# Patient Record
Sex: MALE | Race: WHITE | NOT HISPANIC OR LATINO | URBAN - METROPOLITAN AREA
[De-identification: names, ages, dates, MRNs, and addresses within clinical notes are randomized per-mention and may not be internally consistent; named-entity substitution may affect disease eponyms.]

---

## 2017-01-01 ENCOUNTER — HOSPITAL ENCOUNTER (OUTPATIENT)
Dept: NUCLEAR MEDICINE | Facility: HOSPITAL | Age: 77
Discharge: HOME | End: 2017-04-04
Attending: INTERNAL MEDICINE

## 2017-01-01 ENCOUNTER — AMBULATORY - HEALTHEAST (OUTPATIENT)
Dept: CARDIOLOGY | Facility: CLINIC | Age: 77
End: 2017-01-01

## 2017-01-01 ENCOUNTER — AMBULATORY - HEALTHEAST (OUTPATIENT)
Dept: CARDIAC REHAB | Facility: HOSPITAL | Age: 77
End: 2017-01-01

## 2017-01-01 ENCOUNTER — COMMUNICATION - HEALTHEAST (OUTPATIENT)
Dept: CARDIOLOGY | Facility: CLINIC | Age: 77
End: 2017-01-01

## 2017-01-01 ENCOUNTER — OFFICE VISIT - HEALTHEAST (OUTPATIENT)
Dept: CARDIOLOGY | Facility: CLINIC | Age: 77
End: 2017-01-01

## 2017-01-01 ENCOUNTER — SURGERY - HEALTHEAST (OUTPATIENT)
Dept: CARDIOLOGY | Facility: CLINIC | Age: 77
End: 2017-01-01

## 2017-01-01 ENCOUNTER — COMMUNICATION - HEALTHEAST (OUTPATIENT)
Dept: ADMINISTRATIVE | Facility: CLINIC | Age: 77
End: 2017-01-01

## 2017-01-01 ENCOUNTER — HOSPITAL ENCOUNTER (OUTPATIENT)
Dept: CARDIOLOGY | Facility: HOSPITAL | Age: 77
Discharge: HOME | End: 2017-04-04
Attending: INTERNAL MEDICINE

## 2017-01-01 DIAGNOSIS — I21.11 ST ELEVATION (STEMI) MYOCARDIAL INFARCTION INVOLVING RIGHT CORONARY ARTERY (H): ICD-10-CM

## 2017-01-01 DIAGNOSIS — I25.83 CORONARY ARTERY DISEASE DUE TO LIPID RICH PLAQUE: ICD-10-CM

## 2017-01-01 DIAGNOSIS — Z95.5 STENTED CORONARY ARTERY: ICD-10-CM

## 2017-01-01 DIAGNOSIS — I25.5 ISCHEMIC CARDIOMYOPATHY: ICD-10-CM

## 2017-01-01 DIAGNOSIS — I25.2 HISTORY OF ST ELEVATION MYOCARDIAL INFARCTION (STEMI): ICD-10-CM

## 2017-01-01 DIAGNOSIS — E78.5 HYPERLIPIDEMIA LDL GOAL <70: ICD-10-CM

## 2017-01-01 DIAGNOSIS — I10 UNCONTROLLED HYPERTENSION: ICD-10-CM

## 2017-01-01 DIAGNOSIS — I10 ESSENTIAL HYPERTENSION WITH GOAL BLOOD PRESSURE LESS THAN 130/80: ICD-10-CM

## 2017-01-01 DIAGNOSIS — R00.1 BRADYCARDIA: ICD-10-CM

## 2017-01-01 DIAGNOSIS — I25.10 CORONARY ARTERY DISEASE DUE TO LIPID RICH PLAQUE: ICD-10-CM

## 2017-01-01 LAB
CHOLEST SERPL-MCNC: 138 MG/DL
CV STRESS CURRENT BP HE: NORMAL
CV STRESS CURRENT HR HE: 102
CV STRESS CURRENT HR HE: 103
CV STRESS CURRENT HR HE: 110
CV STRESS CURRENT HR HE: 115
CV STRESS CURRENT HR HE: 116
CV STRESS CURRENT HR HE: 117
CV STRESS CURRENT HR HE: 117
CV STRESS CURRENT HR HE: 122
CV STRESS CURRENT HR HE: 123
CV STRESS CURRENT HR HE: 123
CV STRESS CURRENT HR HE: 124
CV STRESS CURRENT HR HE: 124
CV STRESS CURRENT HR HE: 129
CV STRESS CURRENT HR HE: 129
CV STRESS CURRENT HR HE: 62
CV STRESS CURRENT HR HE: 65
CV STRESS CURRENT HR HE: 72
CV STRESS CURRENT HR HE: 75
CV STRESS CURRENT HR HE: 79
CV STRESS CURRENT HR HE: 80
CV STRESS CURRENT HR HE: 80
CV STRESS CURRENT HR HE: 82
CV STRESS CURRENT HR HE: 86
CV STRESS CURRENT HR HE: 92
CV STRESS CURRENT HR HE: 93
CV STRESS CURRENT HR HE: 97
CV STRESS DEVIATION TIME HE: NORMAL
CV STRESS ECHO PERCENT HR HE: NORMAL
CV STRESS EXERCISE STAGE HE: NORMAL
CV STRESS EXERCISE STAGE REACHED HE: NORMAL
CV STRESS FINAL RESTING BP HE: NORMAL
CV STRESS FINAL RESTING HR HE: 72
CV STRESS MAX HR HE: 129
CV STRESS MAX TREADMILL GRADE HE: 14
CV STRESS MAX TREADMILL SPEED HE: 3.4
CV STRESS PEAK DIA BP HE: NORMAL
CV STRESS PEAK SYS BP HE: NORMAL
CV STRESS PHASE HE: NORMAL
CV STRESS PROTOCOL HE: NORMAL
CV STRESS RESTING PT POSITION HE: NORMAL
CV STRESS RESTING PT POSITION HE: NORMAL
CV STRESS ST DEVIATION AMOUNT HE: NORMAL
CV STRESS ST DEVIATION ELEVATION HE: NORMAL
CV STRESS ST EVELATION AMOUNT HE: NORMAL
CV STRESS TEST TYPE HE: NORMAL
CV STRESS TOTAL STAGE TIME MIN 1 HE: NORMAL
FASTING STATUS PATIENT QL REPORTED: ABNORMAL
HDLC SERPL-MCNC: 40 MG/DL
LDLC SERPL CALC-MCNC: 67 MG/DL
NUC STRESS EJECTION FRACTION: 68 %
STRESS ECHO BASELINE BP: NORMAL
STRESS ECHO BASELINE HR: 60
STRESS ECHO CALCULATED PERCENT HR: 90 %
STRESS ECHO LAST STRESS BP: NORMAL
STRESS ECHO LAST STRESS HR: 129
STRESS ECHO POST ESTIMATED WORKLOAD: 7.3
STRESS ECHO POST EXERCISE DUR MIN: 6
STRESS ECHO POST EXERCISE DUR SEC: 2
STRESS ECHO TARGET HR: 122
TRIGL SERPL-MCNC: 153 MG/DL

## 2017-01-01 RX ORDER — SIMVASTATIN 40 MG
40 TABLET ORAL AT BEDTIME
Qty: 90 TABLET | Refills: 3 | Status: SHIPPED | OUTPATIENT
Start: 2017-01-01

## 2017-01-01 RX ORDER — TICAGRELOR 90 MG/1
TABLET ORAL
Qty: 180 TABLET | Refills: 1 | Status: SHIPPED | OUTPATIENT
Start: 2017-01-01

## 2017-01-01 ASSESSMENT — MIFFLIN-ST. JEOR
SCORE: 1503.24
SCORE: 1503.24
SCORE: 1478.29
SCORE: 1503.24
SCORE: 1507.78
SCORE: 1516.85
SCORE: 1503.24
SCORE: 1527.74
SCORE: 1520.03
SCORE: 1516.85
SCORE: 1516.85

## 2021-05-30 VITALS — WEIGHT: 173 LBS | BODY MASS INDEX: 24.22 KG/M2 | HEIGHT: 71 IN

## 2021-05-30 VITALS — BODY MASS INDEX: 23.8 KG/M2 | HEIGHT: 71 IN | WEIGHT: 170 LBS

## 2021-05-30 VITALS — BODY MASS INDEX: 24.22 KG/M2 | WEIGHT: 173 LBS | HEIGHT: 71 IN

## 2021-05-30 VITALS — WEIGHT: 171 LBS | BODY MASS INDEX: 23.85 KG/M2

## 2021-05-30 VITALS — WEIGHT: 170 LBS | BODY MASS INDEX: 23.71 KG/M2

## 2021-05-30 VITALS — BODY MASS INDEX: 23.85 KG/M2 | WEIGHT: 171 LBS

## 2021-05-30 VITALS — HEIGHT: 71 IN | WEIGHT: 171 LBS | BODY MASS INDEX: 23.94 KG/M2

## 2021-05-30 VITALS — BODY MASS INDEX: 23.99 KG/M2 | WEIGHT: 172 LBS

## 2021-05-30 VITALS — BODY MASS INDEX: 24.22 KG/M2 | HEIGHT: 71 IN | WEIGHT: 173 LBS

## 2021-05-30 VITALS — BODY MASS INDEX: 24.13 KG/M2 | WEIGHT: 173 LBS

## 2021-05-30 VITALS — HEIGHT: 71 IN | WEIGHT: 170 LBS | BODY MASS INDEX: 23.8 KG/M2

## 2021-05-31 VITALS — BODY MASS INDEX: 24.05 KG/M2 | HEIGHT: 70 IN | WEIGHT: 168 LBS

## 2021-06-09 NOTE — PROGRESS NOTES
ITP ASSESSMENT   Assessment Day: 30 Day  Session Number: 9  Precautions: standard  Diagnosis: MI;Stent;CHF  Risk Stratification: Medium  Referring Provider: Dr. Herman    EXERCISE  Exercise Assessment: Reassessment  Pt is exercising in outpatient cardiac rehab 2x/week for 40 minutes. He is progressing well and has begun to do more around his house and yard.                            Exercise Plan  Goals Next 30 days  ADL'S: wash his car  Leisure: put deck furniture out  Work: mod house cleaning    Education Goals: All goals in this section met  Education Goals Met: Patient can state cardiac s/s and appropriate emergency response.;Has system for taking medication.                        Goals Met  Initial ADL's goals met: not met. he reports that he just got the OK from doctor  Initial Leisure goals met: met. he is walking at least 20 min/day  Intial Work goals met: not met,  Initial Progression: exercising at 2.6-3.7 mets    Exercise Prescription  Exercise Mode: Treadmill;Bike;Nustep;Arm Erg.;Stairs  Frequency: 2x/week  Duration: 40 min  Intensity / THR: 20-30 beats above resting heart rate  RPE 11-14  Progression / Met level: 2.6-3.7  Resistive Training?: No    Current Exercise (mins/week): 140    Interventions  Home Exercise:  Mode: walk  Frequency: daily  Duration: 30-40 minutes    Education Material : Educational videos;Provide written material;Individual education and counseling;Offer educational classes    Education Completed  Exercise Education Completed: Signs and Symptoms;RPE;Emergency Plan;Home Exercise;Warm up/cool down;FITT Principles;BP/HR Reponse to exercise;Benefits of Exercise;End point of exercise            Exercise Follow-up/Discharge  Follow up/Discharge: Pt is walking daily         NUTRITION  Nutrition Assessment: Reassessment    Nutrition Risk Factors:  Nutrition Risk Factors: Dyslipidemia  Cholesterol: 138  LDL: 67  HDL: 40  Triglycerides: 153    Nutrition Plan  Interventions  Nutrition  "Interventions: Diet consult;Provide with written material  Rate Your Plate Score: 58    Education Completed  Nutrition Education Completed: Low Saturated fat diet;Low sodium diet    Goals  Nutrition Goals (Next 30 days): Patient will follow a low sodium diet;Patient will follow a low saturated fat diet    Goals Met  Nutrition Goals Met: Patient follows a low sodium diet;Patient states following a low saturated fat diet    Height, Weight, and  BMI  Weight: 172 lb (78 kg)  Height: 5' 11\" (1.803 m)  BMI: 24    Nutrition Follow-up  Follow-up/Discharge: Reviewed recent lipid profile     Other Risk Factors  Other Risk Factor Assessment: Reassessment    HTN Risk Factor: Hypertension    Pre Exercise BP: 124/62  Post Exercise BP: 140/70    Hypertension Plan  Goals  HTN Goals: Patient demonstrates understanding of HTN, no goals identified for the next 30 days    Goals Met  HTN Goals Met: Follow low sodium diet;Take medication as prescribed;Exercises regularly    HTN Interventions  HTN Interventions: Diet consult;Therapist/patient discussion;Provide written material;Offer educational videos;Offer educational classes    HTN Education Completed  HTN Education Completed: Low sodium diet;Medication review;Low sodium class    Tobacco Risk Factor: NA      Risk Factor Follow-up   Follow-up/Discharge: monitor BP/HR, recently off of metoprolol and statin was increased to 40 mg from 20.       PSYCHOSOCIAL  Psychosocial Assessment: Reassessment     [place] CO[employer] Q of L Summary Score: 17    KAILA-D Score: 1    Psychosocial Risk Factor: NA    Psychosocial Plan  Interventions  Interventions: Offer educational videos and classes;Provide written material;Individual education and counseling    Education Completed  Education Completed: Relaxation/Coping Techniques    Goals  Goals (Next 30 days): Identified Support system;Oriented to stress management classes;Identify stressors;Improvement in [place] [employer] score;Practicing stress management " skills          Goals Met  Goals Met: Identified Support system;Oriented to stress management classes;Identify stressors    Psychosocial Follow-up  Follow-up/Discharge: Pt reports that stress is not an issue with him         Patient involved in Goal setting?: Yes    Signature: _____________________________________________________________    Date: __________________    Time: __________________See Doc Flowsheet

## 2021-06-09 NOTE — PROGRESS NOTES
Cardiac Rehab  Phase II Assessment    Assessment Date: 3/2/17    Diagnosis: STEMI;   ICM  Date of Onset: 2/16/17  Procedure: PCI/ STENTS x 3  Date of Onset: 2/16/17  ICD/Pacemaker: No   Post-op Complications:   ECG History: SB/SR;  Occ. PVC's EF%:40%  Past Medical History:  Patient Active Problem List   Diagnosis     ST elevation (STEMI) myocardial infarction involving right coronary artery     Hyperlipidemia LDL goal <70     Essential hypertension     Gastroesophageal reflux disease without esophagitis     Ischemic cardiomyopathy     Past Medical History:   Diagnosis Date     GERD (gastroesophageal reflux disease)      Hyperlipidemia      Hypertension            Physical Assessment  Precautions/ Physical Limitations: None  Oxygen: No  O2 Sats: % Lung Sounds: Clear Edema:   Incisions:   Sleeping Pattern: fair   Appetite: good   Nutrition Risk Screen: Weight loss    Pain  Location:   Characteristics:  Intensity: (0-10 scale) 0  Current Pain Management:     Intervention:   Response:       Psychosocial/ Emotional Health  1. In the past 12 months, have you been in a relationship where you have been abused physically, emotionally, sexually or financially? No  notified: NA  2. Who do you turn to for emotional support?: Wife  3. Do you have cultural or spiritual needs? No  4. Have there been any major life changes in the past 12 months? Yes ; MI/STENTS    Referral Information  Primary Physician: Saurabh Hill  Cardiologist: Dr. Herman  Surgeon:     Home exercise/Equipment: Health Anil    Patient's long-term goal(s): Resume all previous household and Yard tasks;      1. Living Accommodations: Home Steps: Yes      Support people at home: Wife;  2 adult children in the area   2. Marital Status:   3. Family is able to assist with cares      Jain/Community involvement: Yes  4. Recreation/Hobbies: Computer;  TV; Walking; Household remodeling        See Doc Flowsheet

## 2021-06-09 NOTE — PROGRESS NOTES
Patient seen in clinic for HF education s/p recent hospital discharge 2/19/17.  Reviewed HF Binder that includes the  HF Sx Awareness & Action plan  handout and  A Stronger Pump  booklet and Weight log booklet highlighting :  __X_patient s type of heart failure _X__Na management in diet  __X_importance of daily wts  _X__Fluid Guidelines, if applicable  __X_medication review and importance of compliance     Instructed patient in signs and sx of heart failure, reiterated when to call clinic - reviewed HF hotline # [phone] and after hours call # [phone].  Majority of time was spent reviewing: HF binder. Patient has been following a low sodium diet, is reading nutrition labels for sodium content as well as serving size. He has been doing this for years as his father passed from an MI.  Patient verbalized understanding of HF discussion.  Plan for f/u with continued HF education reviewed.

## 2021-06-09 NOTE — PROGRESS NOTES
Thank you for asking the [facility] team to see Dalton Fregoso      Assessment/Plan:     CAD s/p inferior STEMI - Check LDL today on simvastatin 20mg daily. Continue aspirin indefinitely and Brillinta x 12 mos for his MI. Continue cardiac rehab.     Moderate LAD disease - stress test to look for anterior or apical ischemia.     Ischemic cardimoyopathy - medication titration limited by bradycardia and hyperkalemia. Will see what EF is on stress test.    HTN - controlled    Bradycardia - will stop metoprolol all together    F/U 6 months       Current History:   Dalton Fregoso is a 76 y.o. with hypertension, hyperlipidemia, and CAD s/p inferior STEMI February 16th with synergy BARBARA placed to the ostial and mid RCA and the distal circumflex. He was noted to have moderate LAD disease as well. His EF by echo was 40% with inferior and inferior lateral hypokinesis. Peak troponin was just 12.9. Post MI he had fatigue and his metoprolol was decreased. His potassium was elevated at 5.3 so his lisinopril was not titrated. He has a history of myalgias with atorvastatin.     He returns for f/u today and only notes orthostatic dizziness since his MI. He denies chest pain, dyspnea or palpitations. No PND/orthopnea, or edema. He is participating in cardiac rehab without any real trouble.     Past Medical History:     Past Medical History:   Diagnosis Date     GERD (gastroesophageal reflux disease)      Hyperlipidemia      Hypertension        Past Surgical History:     Past Surgical History:   Procedure Laterality Date     MI CATH PLACEMENT & NJX CORONARY ART ANGIO IMG S&I N/A 2/16/2017    Procedure: Coronary Angiogram;  Surgeon: Lulu Herman MD;  Location: [facility] Cath Lab;  Service: Cardiology     MI L HRT CATH W/NJX L VENTRICULOGRAPHY IMG S&I N/A 2/16/2017    Procedure: Left Heart Catheterization with Left Ventriculogram;  Surgeon: Lulu Herman MD;  Location: [facility] Cath Lab;  Service: Cardiology  "      Family History:     Family History   Problem Relation Age of Onset     Heart disease Father        Social History:    reports that he has never smoked. He does not have any smokeless tobacco history on file. He reports that he drinks alcohol. He reports that he does not use illicit drugs.    Meds:     Current Outpatient Prescriptions   Medication Sig     aspirin 81 mg chewable tablet Chew 81 mg daily.     cholecalciferol, vitamin D3, 1,000 unit tablet Take 2,000 Units by mouth daily.     lisinopril (PRINIVIL,ZESTRIL) 10 MG tablet Take 10 mg by mouth daily.     metoprolol tartrate (LOPRESSOR) 25 MG tablet Take 0.5 tablets (12.5 mg total) by mouth 2 (two) times a day.     multivitamin with minerals (THERA-M) 9 mg iron-400 mcg Tab tablet Take 1 tablet by mouth daily.     nitroglycerin (NITROSTAT) 0.4 MG SL tablet Place 1 tablet (0.4 mg total) under the tongue every 5 (five) minutes as needed for chest pain.     simvastatin (ZOCOR) 10 MG tablet Take 2 tablets (20 mg total) by mouth bedtime.     ticagrelor (BRILINTA) 90 mg Tab Take 1 tablet (90 mg total) by mouth 2 (two) times a day.       Allergies:   Review of patient's allergies indicates no known allergies.    Review of Systems:   Review of Systems:   General: WNL  Eyes: Visual Distubance (Bilateral - hard to focus at times)  Ears/Nose/Throat: WNL  Lungs: WNL     Stomach: Diarrhea, Heartburn  Bladder: WNL  Muscle/Joints: WNL  Skin: WNL  Nervous System: Dizziness  Mental Health: WNL     Blood: WNL       Objective:      Physical Exam  @LASTENCWT:3@  5' 11\" (1.803 m)  @BMI:3@  /66 (Patient Site: Left Arm, Patient Position: Sitting, Cuff Size: Adult Regular)  Pulse (!) 48  Ht 5' 11\" (1.803 m)  Wt 171 lb (77.6 kg)  BMI 23.85 kg/m2    General Appearance:   Alert, cooperative and in no acute distress.   HEENT:  No scleral icterus; the mucous membranes were pink and moist.   Neck: JVP flat. No thyromegaly. No HJR   Chest: The spine was straight. The chest was " symmetric.   Lungs:   Respirations unlabored; the lungs are clear to auscultation.   Cardiovascular:   S1 and S2 normal and without murmur. No clicks or rubs. No carotid bruits noted. Right DP, PT, and radial pulses 2+. Left DP, PT, and radial pulses 2+.   Abdomen:  No organomegaly, masses, bruits, or tenderness. Bowels sounds are present   Extremities: No cyanosis, clubbing, or edema.   Skin: No xanthelasma.   Neurologic: Mood and affect are appropriate.         Lab Review   Lab Results   Component Value Date     03/06/2017     02/17/2017     02/16/2017    K 5.3 (H) 03/06/2017    K 3.8 02/17/2017    K 3.5 02/16/2017     03/06/2017     02/17/2017     02/16/2017    CO2 26 03/06/2017    CO2 22 02/17/2017    CO2 24 02/16/2017    BUN 19 03/06/2017    BUN 15 02/17/2017    BUN 16 02/16/2017    CREATININE 1.22 03/06/2017    CREATININE 0.98 02/17/2017    CREATININE 1.21 02/16/2017    CALCIUM 9.3 03/06/2017    CALCIUM 9.0 02/17/2017    CALCIUM 9.5 02/16/2017     Lab Results   Component Value Date    WBC 12.7 (H) 02/16/2017    HGB 14.1 02/17/2017    HGB 15.8 02/16/2017    HCT 46.8 02/16/2017    MCV 96 02/16/2017     02/17/2017     02/16/2017     Lab Results   Component Value Date    CHOL 163 02/17/2017    TRIG 153 (H) 02/17/2017    HDL 46 02/17/2017     No results found for: BNP      Lulu Herman M.D.

## 2021-06-09 NOTE — PROGRESS NOTES
Assessment/Plan:     1.  Coronary artery disease:  He was hospitalized February 16 to February 19 with a STEMI.  Coronary angiogram showed severe disease of RCA, severe left circumflex, and mild to moderate LAD disease.  He received a drug-eluting stent to ostial RCA, drug-eluting stent to proximal RCA, and drug-eluting stent to distal left circumflex.  Dual antiplatelet therapy is being used with aspirin indefinitely and ticagrelor for 6 months. We discussed the importance of antiplatelet therapy and talking with his cardiologist prior to stopping these medications for any reason.  Puncture site is well-healed.      Risk factor modification and lifestyle management topics were discussed including managing comorbidities, heart healthy diet and exercise.  He is participating in cardiac rehab.    2.  Ischemic cardiomyopathy, ejection fraction 40%: He has no symptoms of acute heart failure. We reviewed heart failure diagnosis, medications, treatment plan, low sodium diet, weight monitoring, and symptom monitoring.  He met with a heart failure nurse clinician to further discuss.  We discussed rechecking echocardiogram 3 months following MI to reassess ejection fraction.    3.  Bradycardia: He was bradycardic during hospitalization.  Heart rate 48 bpm today.  Maximum heart rate at cardiac rehab was 60 bpm.  He has noticed fatigue since discharge from the hospital.  He denies any lightheadedness or dizziness.  Metoprolol decreased to 12.5 mg twice daily.    4.  Dyslipidemia: Dalton Fregoso is on moderate intensity statin therapy with simvastatin 20 mg daily.  We discussed a diet low in saturated fat, weight loss, and exercise along with medication for better control of cholesterol.     5.  Hypertension: His blood pressure is well controlled today taking metoprolol and lisinopril.    Dalton has follow-up with his primary care provider later today.  He will follow-up with Dr. Herman on March 27 and in the heart failure  clinic in 6 weeks.    Subjective:     Dalton Fregoso is seen at [facility] for post coronary intervention follow up.  He was hospitalized February 16 to February 19 with a STEMI.  Coronary angiogram showed severe disease of RCA, severe left circumflex, and mild to moderate LAD disease.  He received a drug-eluting stent to ostial RCA, drug-eluting stent to proximal RCA, and drug-eluting stent to distal left circumflex.  Dual antiplatelet therapy is being used with aspirin indefinitely and ticagrelor for 6 months.  Echocardiogram on February 17, 2017 showed an ejection fraction of 40%.  His past medical history is also significant for dyslipidemia and hypertension.    His main concern today is fatigue since hospitalization.  He is tired after exercise and needs to take more frequent naps.  Heart rate at cardiac rehab was 48 bpm resting in only 60 bpm with maximum exercise.  He denies any lightheadedness or dizziness.  He denies shortness of breath, dyspnea on exertion, orthopnea, chest pain and lower extremity edema.  His home weight has remained stable around 165 pounds.  He is following a low-sodium diet.  He is participating in cardiac rehab and walking 30-40 minutes daily.    Review of Systems:   General: WNL  Eyes: WNL  Ears/Nose/Throat: WNL  Lungs: WNL  Heart: WNL  Stomach: WNL  Bladder: WNL  Muscle/Joints: WNL  Skin: WNL  Nervous System: WNL  Mental Health: WNL     Blood: WNL     Patient Active Problem List   Diagnosis     ST elevation (STEMI) myocardial infarction involving right coronary artery     Hyperlipidemia LDL goal <70     Essential hypertension     Gastroesophageal reflux disease without esophagitis     Ischemic cardiomyopathy       Past Medical History:   Diagnosis Date     GERD (gastroesophageal reflux disease)      Hyperlipidemia      Hypertension        Past Surgical History:   Procedure Laterality Date     UT CATH PLACEMENT & NJX CORONARY ART ANGIO IMG S&I N/A 2/16/2017    Procedure:  Coronary Angiogram;  Surgeon: Lulu Herman MD;  Location: [facility] Cath Lab;  Service: Cardiology     NV L HRT CATH W/NJX L VENTRICULOGRAPHY IMG S&I N/A 2/16/2017    Procedure: Left Heart Catheterization with Left Ventriculogram;  Surgeon: Lulu Herman MD;  Location: [facility] Cath Lab;  Service: Cardiology       Family History   Problem Relation Age of Onset     Heart disease Father        Social History     Social History     Marital status:      Spouse name: N/A     Number of children: N/A     Years of education: N/A     Occupational History     Not on file.     Social History Main Topics     Smoking status: Never Smoker     Smokeless tobacco: Not on file     Alcohol use Yes      Comment: 2-3 drinks/day (tate)     Drug use: No     Sexual activity: Not on file     Other Topics Concern     Not on file     Social History Narrative       Current Outpatient Prescriptions   Medication Sig Dispense Refill     aspirin 81 mg chewable tablet Chew 81 mg daily.       cholecalciferol, vitamin D3, 1,000 unit tablet Take 2,000 Units by mouth daily.       lisinopril (PRINIVIL,ZESTRIL) 10 MG tablet Take 10 mg by mouth daily.       metoprolol tartrate (LOPRESSOR) 25 MG tablet Take 0.5 tablets (12.5 mg total) by mouth 2 (two) times a day. 60 tablet 0     multivitamin with minerals (THERA-M) 9 mg iron-400 mcg Tab tablet Take 1 tablet by mouth daily.       nitroglycerin (NITROSTAT) 0.4 MG SL tablet Place 1 tablet (0.4 mg total) under the tongue every 5 (five) minutes as needed for chest pain. 90 tablet 0     simvastatin (ZOCOR) 10 MG tablet Take 2 tablets (20 mg total) by mouth bedtime. 30 tablet 0     ticagrelor (BRILINTA) 90 mg Tab Take 1 tablet (90 mg total) by mouth 2 (two) times a day. 180 tablet 1     No current facility-administered medications for this visit.        No Known Allergies    Objective:     Vitals:    03/06/17 1522   BP: 122/66   Pulse: (!) 48   Resp: 16   SpO2: 98%     Body mass index is 24.13  kg/(m^2).      General Appearance:   Alert, cooperative and in no acute distress.   HEENT:  No scleral icterus; the mucous membranes were pink and moist.   Chest: The spine was straight. The chest was symmetric.   Lungs:   Respirations unlabored; the lungs are clear to auscultation.   Cardiovascular:   Regular rhythm, bradycardia. S1 and S2 without murmur, clicks or rubs. Carotid pulses are intact and symmetrical.  No carotid bruits noted.   Abdomen:  Soft, nontender, nondistended, bowel sounds present   Extremities: No cyanosis, clubbing, or edema.   Skin: No xanthelasma.   Neurologic: Mood and affect are appropriate.   Puncture site: Right radial site is soft with no bruising.  Radial pulses and Pedal pulses intact and symmetrical.  CMS intact.         Lab Review   Lab Results   Component Value Date    CREATININE 0.98 02/17/2017    BUN 15 02/17/2017     02/17/2017    K 3.8 02/17/2017     02/17/2017    CO2 22 02/17/2017     CREATININE (mg/dL)   Date Value   02/17/2017 0.98   02/16/2017 1.21       Cardiographics  Echocardiogram 2/17/17:    Left ventricle ejection fraction is moderately decreased. The estimated left ventricular ejection fraction is 40%.    The following segments are hypokinetic: basal inferolateral, mid inferoseptal, mid inferior, mid inferolateral and apical lateral. All other segments are normal.    No hemodynamically significant valvular heart abnormalities.      25 minutes were spent with the patient with greater than 50% spent on education and counseling.      Jenny Conte, [facility]

## 2021-06-09 NOTE — PROGRESS NOTES
ITP ASSESSMENT   Assessment Day: Initial  Session Number: 1  Precautions: Standard  Diagnosis: MI;Stent;CHF  Risk Stratification: Medium  Referring Provider: Lulu Herman MD  EXERCISE  Exercise Assessment: Initial     6 Minute Walk Test   Pre   Pre Exercise HR: 45                  Pre Exercise BP: 141/65    Peak  Peak HR: 63                 Peak BP: 147/71  Peak feet: 1350  Peak O2 SAT: 98  Peak RPE: 13  Peak MPH: 2.56    Symptoms:  Peak Symptoms: None    5 mins. 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    Exercise Prescription  Exercise Mode: Treadmill;Bike;Nustep;Arm Erg.;Stairs  Frequency: 2 x week  Duration: 40'  Intensity / THR: 20-30 beats above resting heart rate  RPE 11-14  Progression / Met level: 2.9-3.2 Mets  Resistive Training?: No (Will complete in the future)    Current Exercise (mins/week): 15    Interventions  Home Exercise:  Mode: Walk   Frequency: 5-7 days per week  Duration: 20-30'    Education Material : Educational videos;Provide written material;Individual education and counseling;Offer educational classes    Education Completed  Exercise Education Completed: Signs and Symptoms;RPE;Emergency Plan;Home Exercise;Warm up/cool down;FITT Principles;BP/HR Reponse to exercise;Benefits of Exercise;End point of exercise            Exercise Follow-up/Discharge  Follow up/Discharge: Encouraged  pt to walk 5-7 days per week 20-30' NUTRITION  Nutrition Assessment: Initial    Nutrition Risk Factors:  Nutrition Risk Factors:  "Dyslipidemia  Cholesterol: 163  LDL: 86  HDL: 46  Triglycerides: 153    Nutrition Plan  Interventions  Nutrition Interventions: Diet consult;Diet class;Therapist/Patient discussion;Educational videos;Provide with written material    Education Completed  Nutrition Education Completed: Risk factor overview    Goals  Nutrition Goals (Next 30 days): Review Dietitian schedule;Provide Rate your Plate Survey    Goals Met  Nutrition Goals Met: Provided Rate your Plate Survey;Reviewed Dietitian schedule    Height, Weight, and  BMI  Weight: 173 lb (78.5 kg)  Height: 5' 11\" (1.803 m)  BMI: 24.14    Nutrition Follow-up  Follow-up/Discharge: Encouraged pt to complete Diet Habit Survey       Other Risk Factors  Other Risk Factor Assessment: Initial    HTN Risk Factor: Hypertension    Pre Exercise BP: 141/65  Post Exercise BP: 149/73    Hypertension Plan  Goals  HTN Goals: Follow low sodium diet;Take medication as prescribed;Exercises regularly    Goals Met  HTN Goals Met: Take medication as prescribed    HTN Interventions  HTN Interventions: Diet consult;Therapist/patient discussion;Provide written material;Offer educational videos;Offer educational classes    HTN Education Completed  HTN Education Completed: Risk factor overview    Tobacco Risk Factor: NA  Completed  No Data Recorded    Risk Factor Follow-up   Follow-up/Discharge: Will continue to Monitor BP's   PSYCHOSOCIAL  Psychosocial Assessment: Initial     [place] COOP Q of L Summary Score: 17    KAILA-D Score: 1    Psychosocial Risk Factor: NA    Psychosocial Plan  Interventions    Interventions: Offer educational videos and classes;Provide written material;Individual education and counseling    Education Completed  Education Completed: Relaxation/Coping Techniques    Goals  Goals (Next 30 days): Identified Support system;Oriented to stress management classes;Identify stressors;Improvement in [place] COOP score;Practicing stress management skills    Goals Met  Goals " Met: Identified Support system;Oriented to stress management classes;Identify stressors    Psychosocial Follow-up  Follow-up/Discharge: Reports he does not have stress           Patient involved in Goal setting?: Yes    Signature: _____________________________________________________________    Date: __________________    Time: __________________

## 2021-06-10 NOTE — PROGRESS NOTES
ITP ASSESSMENT   Assessment Day: 60 Day/ Discharge Note  Session Number: 13  Precautions: Standard  Diagnosis: MI;Stent;CHF  Risk Stratification: Medium  Referring Provider: Dr. Herman  EXERCISE  Exercise Assessment: Discharge     6 Minute Walk Test   Pre   Pre Exercise HR: 62                  Pre Exercise BP: 108/58    Peak  Peak HR: 87                 Peak BP: 135/68  Peak feet: 1500  Peak O2 SAT: 97  Peak RPE: 11  Peak MPH: 2.84    Symptoms:  Peak Symptoms: Minimal SOB    5 mins. Post  5 Min Post HR: 60  5 Min Post BP: 91/63                         Exercise Plan  Goals Next 30 days  ADL'S: wash his car  Leisure: put deck furniture out  Work: mod house cleaning    Education Goals: All goals in this section met  Education Goals Met: Patient can state cardiac s/s and appropriate emergency response.;Has system for taking medication.                        Goals Met  30 day ADL'S goals met: Tolerates washing his car  30 day Leisure goals met: Has not yet got the deck furniture out  30 day Work goals met: Assists with moderate housecleaning  30 Day Progression: Reports he has resumed nearly all activiities as to prior  to CEE MI/  Stents    Initial ADL's goals met: not met. he reports that he just got the OK from doctor  Initial Leisure goals met: met. he is walking at least 20 min/day  Intial Work goals met: not met,  Initial Progression: exercising at 2.6-3.7 mets    Exercise Prescription  Exercise Mode: Treadmill;Bike;Nustep;Arm Erg.;Stairs  Frequency: 2x/week  Duration: 40 min  Intensity / THR: 20-30 beats above resting heart rate  RPE 11-14  Progression / Met level: 2.6-3.7  Resistive Training?: No    Current Exercise (mins/week): 200    Interventions  Home Exercise:  Mode: Walk  Frequency: 5 days per week   Duration: 30-40'    Education Material : Educational videos;Provide written material;Individual education and counseling;Offer educational classes    Education Completed  Exercise Education Completed:  "Cardiac Anatomy;Signs and Symptoms;Medication review;RPE;Emergency Plan;Home Exercise;Warm up/cool down;FITT Principles;BP/HR Reponse to exercise;Strength training;Benefits of Exercise;End point of exercise            Exercise Follow-up/Discharge  Follow up/Discharge: Reviewed HP; SX of intolerance ; emergency plan.  Plans to walk 4-5 x week NUTRITION  Nutrition Assessment: Discharge    Nutrition Risk Factors:  Nutrition Risk Factors: Dyslipidemia  Cholesterol: 138  LDL: 67  HDL: 40  Triglycerides: 153    Nutrition Plan  Interventions  Nutrition Interventions: Diet consult;Provide with written material  Rate Your Plate Score: 58    Education Completed  Nutrition Education Completed: Low Saturated fat diet;Low sodium diet    Goals  Nutrition Goals (Next 30 days): Patient will follow a low sodium diet;Patient will follow a low saturated fat diet    Goals Met  Nutrition Goals Met: Patient follows a low sodium diet;Patient states following a low saturated fat diet    Height, Weight, and  BMI  Weight: 170 lb (77.1 kg)  Height: 5' 11\" (1.803 m)  BMI: 23.72    Nutrition Follow-up  Follow-up/Discharge: Reports he follows a Low Na Diet       Other Risk Factors  Other Risk Factor Assessment: Discharge    HTN Risk Factor: Hypertension    Pre Exercise BP: 108/58  Post Exercise BP: 128/60    Hypertension Plan  Goals  HTN Goals: Patient demonstrates understanding of HTN, no goals identified for the next 30 days    Goals Met  HTN Goals Met: Follow low sodium diet;Take medication as prescribed;Exercises regularly    HTN Interventions  HTN Interventions: Diet consult;Therapist/patient discussion;Provide written material;Offer educational videos;Offer educational classes    HTN Education Completed  HTN Education Completed: Low sodium diet;Medication review;Low sodium class    Tobacco Risk Factor: NA      Risk Factor Follow-up   Follow-up/Discharge: Monitors BP at home.   PSYCHOSOCIAL  Psychosocial Assessment: Discharge   Post " [facility] 10  Post Julio Cesar-D 7    Psychosocial Risk Factor: NA    Psychosocial Plan  Interventions  Interventions: Offer educational videos and classes;Provide written material;Individual education and counseling    Education Completed  Education Completed: Relaxation/Coping Techniques    Goals  Goals (Next 30 days): Identified Support system;Oriented to stress management classes;Identify stressors;Improvement in [facility] COOP score;Practicing stress management skills    Goals Met  Goals Met: Identified Support system;Oriented to stress management classes;Identify stressors    Psychosocial Follow-up  Follow-up/Discharge: Reports no issues with dealing with stress         Pt completed 13 Phase 2 sessions.  Requested to be done with rehab.  Reviewed HP; SX of intolerance  Emergency plan .  Goals met.    Signature: _____________________________________________________________    Date: __________________    Time: __________________

## 2021-06-13 NOTE — PROGRESS NOTES
Thank you for asking the [facility] team to see Dalton Fregoso      Assessment/Plan:     CAD s/p inferior STEMI - LDL at goal on simvastatin. Continue aspirin indefinitely and Brillinta x 12 mos for his MI. Exercise and diet discussed.      Ischemic cardimoyopathy - Euvolemic, EF normalized on stress in April. Medication titration limited by bradycardia and hyperkalemia.      HTN - controlled     Bradycardia - Asymptomatic. Not on any rate related medications.     F/U 1 year         Current History:   Dalton Fregoso is a 76 y.o. with hypertension, hyperlipidemia, and CAD s/p inferior STEMI February 16th with synergy BARBARA placed to the ostial and mid RCA and the distal circumflex. He was noted to have moderate LAD disease as well. His EF by echo was 40% with inferior and inferior lateral hypokinesis. Peak troponin was just 12.9. Post MI he had fatigue and his metoprolol was decreased. His potassium was elevated at 5.3 so his lisinopril was not titrated. He has a history of myalgias with atorvastatin. In June he was admitted with epigastric pain that was GI. He was bradycardic and his BB was stopped. Lisionpril was titrated. Labs later in June showed K 4.4, creat 1.03, and LDL 65. Stress test in April showed a normalized EF and no anterior ischemia.     He returns for f/u today and reports feeling well. He denies chest pain, dyspnea or palpitations. No PND/orthopnea, or edema. He is very active at home and does a Digital [employer] machine in the winter.         Past Medical History:     Past Medical History:   Diagnosis Date     Coronary artery disease      GERD (gastroesophageal reflux disease)      High cholesterol      Hyperlipidemia      Hypertension      Myocardial infarction 02/16/2017       Past Surgical History:     Past Surgical History:   Procedure Laterality Date     CARDIAC CATHETERIZATION       CORONARY STENT PLACEMENT       OK CATH PLACEMENT & NJX CORONARY ART ANGIO IMG S&I N/A 2/16/2017    Procedure:  Coronary Angiogram;  Surgeon: Lulu Herman MD;  Location: [facility] Cath Lab;  Service: Cardiology     FL L HRT CATH W/NJX L VENTRICULOGRAPHY IMG S&I N/A 2/16/2017    Procedure: Left Heart Catheterization with Left Ventriculogram;  Surgeon: Lulu Herman MD;  Location: [facility] Cath Lab;  Service: Cardiology       Family History:     Family History   Problem Relation Age of Onset     Heart disease Father        Social History:    reports that he has never smoked. He does not have any smokeless tobacco history on file. He reports that he drinks alcohol. He reports that he does not use illicit drugs.    Meds:     Current Outpatient Prescriptions   Medication Sig     aspirin 81 MG EC tablet Take 81 mg by mouth daily.     BRILINTA 90 mg Tab TAKE ONE TABLET BY MOUTH TWICE A DAY     cholecalciferol, vitamin D3, 1,000 unit tablet Take 2,000 Units by mouth daily.     lisinopril (PRINIVIL,ZESTRIL) 10 MG tablet Take 2 tablets (20 mg total) by mouth daily. (Patient taking differently: Take 10 mg by mouth daily. )     multivitamin with minerals (THERA-M) 9 mg iron-400 mcg Tab tablet Take 1 tablet by mouth daily.     nitroglycerin (NITROSTAT) 0.4 MG SL tablet Place 1 tablet (0.4 mg total) under the tongue every 5 (five) minutes as needed for chest pain.     simvastatin (ZOCOR) 40 MG tablet Take 1 tablet (40 mg total) by mouth at bedtime.     triamcinolone (KENALOG) 0.1 % cream Apply topically 2 (two) times a day.     azelaic acid (FINACEA) 15 % gel Apply topically 2 (two) times a day. After skin is thoroughly washed and patted dry, gently but thoroughly massage a thin film of azelaic acid cream into the affected area twice daily, in the morning and evening.       Allergies:   Review of patient's allergies indicates no known allergies.    Review of Systems:   Review of Systems:   General: WNL  Eyes: WNL  Ears/Nose/Throat: WNL  Lungs: WNL  Heart: WNL  Stomach: WNL  Bladder: WNL  Muscle/Joints: WNL  Skin: WNL  Nervous  "System: WNL  Mental Health: WNL     Blood: Easy Bleeding, Easy Bruising       Objective:      Physical Exam  @LASTENCWT:3@  5' 10\" (1.778 m)  @BMI:3@  /68 (Patient Site: Right Arm, Patient Position: Sitting, Cuff Size: Adult Regular)  Pulse (!) 52  Resp 16  Ht 5' 10\" (1.778 m)  Wt 168 lb (76.2 kg)  BMI 24.11 kg/m2    General Appearance:   Alert, cooperative and in no acute distress.   HEENT:  No scleral icterus; the mucous membranes were pink and moist.   Neck: JVP flat. No thyromegaly. No HJR   Chest: The spine was straight. The chest was symmetric.   Lungs:   Respirations unlabored; the lungs are clear to auscultation.   Cardiovascular:   S1 and S2 normal and without murmur. No clicks or rubs. No carotid bruits noted. Right DP, PT, and radial pulses 2+. Left DP, PT, and radial pulses 2+.   Abdomen:  No organomegaly, masses, bruits, or tenderness. Bowels sounds are present   Extremities: No cyanosis, clubbing, or edema.   Skin: No xanthelasma.   Neurologic: Mood and affect are appropriate.         Lab Review   Lab Results   Component Value Date     06/01/2017     05/31/2017     03/27/2017    K 4.1 06/01/2017    K 4.1 05/31/2017    K 4.6 03/27/2017     (H) 06/01/2017     05/31/2017     03/27/2017    CO2 28 06/01/2017    CO2 25 05/31/2017    CO2 25 03/27/2017    BUN 14 06/01/2017    BUN 14 05/31/2017    BUN 12 03/27/2017    CREATININE 0.92 06/01/2017    CREATININE 1.13 05/31/2017    CREATININE 0.98 03/27/2017    CALCIUM 9.2 06/01/2017    CALCIUM 9.5 05/31/2017    CALCIUM 9.6 03/27/2017     Lab Results   Component Value Date    WBC 6.7 06/01/2017    WBC 9.0 05/31/2017    WBC 12.7 (H) 02/16/2017    HGB 13.9 (L) 06/01/2017    HGB 15.5 05/31/2017    HGB 14.1 02/17/2017    HCT 40.6 06/01/2017    HCT 44.9 05/31/2017    HCT 46.8 02/16/2017    MCV 91 06/01/2017    MCV 91 05/31/2017    MCV 96 02/16/2017     06/01/2017     05/31/2017     05/31/2017     Lab " Results   Component Value Date    CHOL 138 03/27/2017    CHOL 163 02/17/2017    TRIG 153 (H) 03/27/2017    TRIG 153 (H) 02/17/2017    HDL 40 03/27/2017    HDL 46 02/17/2017     Lab Results   Component Value Date    BNP 74 05/31/2017         Lulu Herman M.D.

## 2021-06-15 PROBLEM — I25.5 ISCHEMIC CARDIOMYOPATHY: Status: ACTIVE | Noted: 2017-01-01

## 2021-06-15 PROBLEM — I25.83 CORONARY ARTERY DISEASE DUE TO LIPID RICH PLAQUE: Status: ACTIVE | Noted: 2017-01-01

## 2021-06-15 PROBLEM — R07.9 CHEST PAIN: Status: ACTIVE | Noted: 2017-01-01

## 2021-06-15 PROBLEM — K21.9 GASTROESOPHAGEAL REFLUX DISEASE WITHOUT ESOPHAGITIS: Status: ACTIVE | Noted: 2017-01-01

## 2021-06-15 PROBLEM — R00.1 BRADYCARDIA: Status: ACTIVE | Noted: 2017-01-01

## 2021-06-15 PROBLEM — I25.10 CORONARY ARTERY DISEASE DUE TO LIPID RICH PLAQUE: Status: ACTIVE | Noted: 2017-01-01

## 2021-06-15 PROBLEM — I21.11 ST ELEVATION (STEMI) MYOCARDIAL INFARCTION INVOLVING RIGHT CORONARY ARTERY (H): Status: ACTIVE | Noted: 2017-01-01

## 2021-06-16 PROBLEM — I25.2 HISTORY OF ST ELEVATION MYOCARDIAL INFARCTION (STEMI): Status: ACTIVE | Noted: 2017-01-01

## 2021-06-25 NOTE — PROGRESS NOTES
Progress Notes by Jenny Conte CNP at 4/17/2017 12:50 PM     Author: Jenny Conte CNP Service: -- Author Type: Nurse Practitioner    Filed: 4/17/2017  1:28 PM Encounter Date: 4/17/2017 Status: Signed    : Jenny Conte CNP (Nurse Practitioner)           Click to link to [facility] NOTE      Assessment/Recommendations   Assessment:    1. Ischemic cardiomyopathy with normalization of ejection fraction at 68%: He has no symptoms of acute heart failure.    2.  Coronary artery disease: Status post STEMI in February 2017 with drug-eluting stent to ostial RCA, drug-eluting stent to proximal RCA, and drug-eluting stent to distal left circumflex.  Dual antiplatelet therapy is being used with aspirin indefinitely and ticagrelor for 1 year.    3.  Hypertension: Blood pressure well controlled.    4.  Bradycardia: Heart rates have improved since stopping metoprolol and he denies any dizziness.    Plan:  1.  Continue current medications  2.  Cardiac rehab    Dalton Fregoso will follow up with Dr. Herman in September 2017.     History of Present Illness    Dalton Fregoso is seen at [facility] for continued follow up.  He has a history of dyslipidemia and hypertension.  He was hospitalized February 2017 with STEMI. He received a drug-eluting stent to ostial RCA, drug-eluting stent to proximal RCA, and drug-eluting stent to distal left circumflex. Dual antiplatelet therapy is being used with aspirin indefinitely and ticagrelor for 12 months. Echocardiogram on February 17, 2017 showed an ejection fraction of 40%.  Stress test on April 4, 2017 showed an ejection fraction of 68% and no further need for PCI at this time.    His metoprolol was discontinued 3 weeks ago due to bradycardia with orthostatic dizziness.  His heart rates have been better and he denies any dizziness.  He has had cold symptoms for the past week including nasal congestion and  cough.  Denies any fevers or chills.  He denies shortness of breath, dyspnea on exertion, chest pain and lower extremity edema.      He is monitoring home weights which are stable. He is following a low sodium diet.  He participates in regular physical activity including cardiac rehab.       Physical Examination Review of Systems   Vitals:    04/17/17 1254   BP: 130/74   Pulse: 64   Resp: 16     Body mass index is 23.71 kg/(m^2).  Wt Readings from Last 3 Encounters:   04/17/17 170 lb (77.1 kg)   04/05/17 170 lb (77.1 kg)   04/04/17 170 lb (77.1 kg)       General Appearance:     Alert, cooperative and in no acute distress.   ENT/Mouth: membranes moist, no oral lesions or bleeding gums.      EYES:  no scleral icterus, normal conjunctivae   Chest/Lungs:   lungs are clear to auscultation, no rales or wheezing, respirations unlabored   Cardiovascular:   Regular. Normal first and second heart sounds with no murmurs, rubs, or gallops; the radial and posterior tibial pulses are intact, no edema bilateral lower extremities    Abdomen:  Soft, nontender, nondistended, bowel sounds present   Extremities: no cyanosis or clubbing   Skin: warm, dry.    Neurologic: mood and affect are appropriate, alert and oriented x3      General: WNL  Eyes: WNL  Ears/Nose/Throat: WNL  Lungs: WNL  Heart: WNL  Stomach: WNL  Bladder: WNL  Muscle/Joints: WNL  Skin: WNL  Nervous System: WNL  Mental Health: WNL     Blood: WNL     Medical History  Surgical History Family History Social History   Past Medical History:   Diagnosis Date   ? Coronary artery disease    ? GERD (gastroesophageal reflux disease)    ? High cholesterol    ? Hyperlipidemia    ? Hypertension    ? Myocardial infarction 02/16/2017    Past Surgical History:   Procedure Laterality Date   ? FL CATH PLACEMENT & NJX CORONARY ART ANGIO IMG S&I N/A 2/16/2017    Procedure: Coronary Angiogram;  Surgeon: Lulu Herman MD;  Location: [facility] Cath Lab;  Service: Cardiology   ? FL L HRT  CATH W/NJX L VENTRICULOGRAPHY IMG S&I N/A 2/16/2017    Procedure: Left Heart Catheterization with Left Ventriculogram;  Surgeon: Lulu Herman MD;  Location: [facility] Cath Lab;  Service: Cardiology    Family History   Problem Relation Age of Onset   ? Heart disease Father     Social History     Social History   ? Marital status:      Spouse name: N/A   ? Number of children: N/A   ? Years of education: N/A     Occupational History   ? Not on file.     Social History Main Topics   ? Smoking status: Never Smoker   ? Smokeless tobacco: Not on file   ? Alcohol use Yes      Comment: 2-3 drinks/day (tate)   ? Drug use: No   ? Sexual activity: Not on file     Other Topics Concern   ? Not on file     Social History Narrative          Medications  Allergies   Current Outpatient Prescriptions   Medication Sig Dispense Refill   ? aspirin 81 mg chewable tablet Chew 81 mg daily.     ? cholecalciferol, vitamin D3, 1,000 unit tablet Take 2,000 Units by mouth daily.     ? lisinopril (PRINIVIL,ZESTRIL) 10 MG tablet Take 10 mg by mouth daily.     ? multivitamin with minerals (THERA-M) 9 mg iron-400 mcg Tab tablet Take 1 tablet by mouth daily.     ? nitroglycerin (NITROSTAT) 0.4 MG SL tablet Place 1 tablet (0.4 mg total) under the tongue every 5 (five) minutes as needed for chest pain. 90 tablet 0   ? simvastatin (ZOCOR) 40 MG tablet Take 1 tablet (40 mg total) by mouth at bedtime. 90 tablet 3   ? ticagrelor (BRILINTA) 90 mg Tab Take 1 tablet (90 mg total) by mouth 2 (two) times a day. 180 tablet 1     No current facility-administered medications for this visit.       No Known Allergies      Lab Results    Chemistry CBC BNP   Lab Results   Component Value Date    CREATININE 0.98 03/27/2017    BUN 12 03/27/2017     03/27/2017    K 4.6 03/27/2017     03/27/2017    CO2 25 03/27/2017     Creatinine (mg/dL)   Date Value   03/27/2017 0.98   03/06/2017 1.22   02/17/2017 0.98   02/16/2017 1.21    Lab Results    Component Value Date    WBC 12.7 (H) 02/16/2017    HGB 14.1 02/17/2017    HCT 46.8 02/16/2017    MCV 96 02/16/2017     02/17/2017    No results found for: BNP  No results found for: BNP       20 minutes were spent with the patient with greater than 50% spent on education and counseling.      Jenny Conte, [facility] [place] [facility]   [facility] Failure [facility]